# Patient Record
Sex: FEMALE | Race: WHITE
[De-identification: names, ages, dates, MRNs, and addresses within clinical notes are randomized per-mention and may not be internally consistent; named-entity substitution may affect disease eponyms.]

---

## 2020-06-13 ENCOUNTER — HOSPITAL ENCOUNTER (EMERGENCY)
Dept: HOSPITAL 62 - ER | Age: 50
LOS: 1 days | Discharge: HOME | End: 2020-06-14
Payer: COMMERCIAL

## 2020-06-13 DIAGNOSIS — F19.988: ICD-10-CM

## 2020-06-13 DIAGNOSIS — R07.9: ICD-10-CM

## 2020-06-13 DIAGNOSIS — Z79.891: ICD-10-CM

## 2020-06-13 DIAGNOSIS — R68.89: Primary | ICD-10-CM

## 2020-06-13 DIAGNOSIS — M54.9: ICD-10-CM

## 2020-06-13 PROCEDURE — 80307 DRUG TEST PRSMV CHEM ANLYZR: CPT

## 2020-06-13 PROCEDURE — 93010 ELECTROCARDIOGRAM REPORT: CPT

## 2020-06-13 PROCEDURE — 71045 X-RAY EXAM CHEST 1 VIEW: CPT

## 2020-06-13 PROCEDURE — 36415 COLL VENOUS BLD VENIPUNCTURE: CPT

## 2020-06-13 PROCEDURE — 85025 COMPLETE CBC W/AUTO DIFF WBC: CPT

## 2020-06-13 PROCEDURE — 99285 EMERGENCY DEPT VISIT HI MDM: CPT

## 2020-06-13 PROCEDURE — 96360 HYDRATION IV INFUSION INIT: CPT

## 2020-06-13 PROCEDURE — 93005 ELECTROCARDIOGRAM TRACING: CPT

## 2020-06-13 PROCEDURE — 80053 COMPREHEN METABOLIC PANEL: CPT

## 2020-06-13 PROCEDURE — 84484 ASSAY OF TROPONIN QUANT: CPT

## 2020-06-13 PROCEDURE — 82553 CREATINE MB FRACTION: CPT

## 2020-06-13 PROCEDURE — 82550 ASSAY OF CK (CPK): CPT

## 2020-06-13 PROCEDURE — 81001 URINALYSIS AUTO W/SCOPE: CPT

## 2020-06-14 VITALS — DIASTOLIC BLOOD PRESSURE: 71 MMHG | SYSTOLIC BLOOD PRESSURE: 113 MMHG

## 2020-06-14 LAB
ADD MANUAL DIFF: NO
ADD MANUAL DIFF: NO
ALBUMIN SERPL-MCNC: 4.1 G/DL (ref 3.5–5)
ALP SERPL-CCNC: 114 U/L (ref 38–126)
ANION GAP SERPL CALC-SCNC: 8 MMOL/L (ref 5–19)
APPEARANCE UR: CLEAR
APTT PPP: YELLOW S
AST SERPL-CCNC: 36 U/L (ref 14–36)
BARBITURATES UR QL SCN: NEGATIVE
BASOPHILS # BLD AUTO: 0 10^3/UL (ref 0–0.2)
BASOPHILS NFR BLD AUTO: 0.3 % (ref 0–2)
BILIRUB DIRECT SERPL-MCNC: 0 MG/DL (ref 0–0.4)
BILIRUB SERPL-MCNC: 0.2 MG/DL (ref 0.2–1.3)
BILIRUB UR QL STRIP: NEGATIVE
BUN SERPL-MCNC: 10 MG/DL (ref 7–20)
CALCIUM: 8.6 MG/DL (ref 8.4–10.2)
CHLORIDE SERPL-SCNC: 103 MMOL/L (ref 98–107)
CK MB SERPL-MCNC: 4.03 NG/ML (ref ?–4.55)
CK SERPL-CCNC: 398 U/L (ref 30–135)
CO2 SERPL-SCNC: 26 MMOL/L (ref 22–30)
EOSINOPHIL # BLD AUTO: 0.1 10^3/UL (ref 0–0.6)
EOSINOPHIL NFR BLD AUTO: 0.8 % (ref 0–6)
ERYTHROCYTE [DISTWIDTH] IN BLOOD BY AUTOMATED COUNT: 14.8 % (ref 11.5–14)
GLUCOSE SERPL-MCNC: 147 MG/DL (ref 75–110)
GLUCOSE UR STRIP-MCNC: 50 MG/DL
HCT VFR BLD CALC: 39.7 % (ref 36–47)
HGB BLD-MCNC: 13.5 G/DL (ref 12–15.5)
KETONES UR STRIP-MCNC: NEGATIVE MG/DL
LYMPHOCYTES # BLD AUTO: 1.5 10^3/UL (ref 0.5–4.7)
LYMPHOCYTES NFR BLD AUTO: 13.4 % (ref 13–45)
MCH RBC QN AUTO: 32.5 PG (ref 27–33.4)
MCHC RBC AUTO-ENTMCNC: 34 G/DL (ref 32–36)
MCV RBC AUTO: 96 FL (ref 80–97)
METHADONE UR QL SCN: NEGATIVE
MONOCYTES # BLD AUTO: 0.6 10^3/UL (ref 0.1–1.4)
MONOCYTES NFR BLD AUTO: 5.4 % (ref 3–13)
NEUTROPHILS # BLD AUTO: 8.8 10^3/UL (ref 1.7–8.2)
NEUTS SEG NFR BLD AUTO: 80.1 % (ref 42–78)
PCP UR QL SCN: NEGATIVE
PH UR STRIP: 5 [PH] (ref 5–9)
PLATELET # BLD: 185 10^3/UL (ref 150–450)
POTASSIUM SERPL-SCNC: 3.9 MMOL/L (ref 3.6–5)
PROT SERPL-MCNC: 7.1 G/DL (ref 6.3–8.2)
PROT UR STRIP-MCNC: NEGATIVE MG/DL
RBC # BLD AUTO: 4.15 10^6/UL (ref 3.72–5.28)
SP GR UR STRIP: 1.01
TOTAL CELLS COUNTED % (AUTO): 100 %
TROPONIN I SERPL-MCNC: 0.01 NG/ML
URINE AMPHETAMINES SCREEN: NEGATIVE
URINE BENZODIAZEPINES SCREEN: NEGATIVE
URINE COCAINE SCREEN: NEGATIVE
URINE MARIJUANA (THC) SCREEN: NEGATIVE
UROBILINOGEN UR-MCNC: NEGATIVE MG/DL (ref ?–2)
WBC # BLD AUTO: 11 10^3/UL (ref 4–10.5)

## 2020-06-14 NOTE — EKG REPORT
SEVERITY:- BORDERLINE ECG -

SINUS RHYTHM

BORDERLINE T ABNORMALITIES, INFERIOR LEADS

:

Confirmed by: Janneth Valentino MD 14-Jun-2020 22:44:06

## 2020-06-14 NOTE — ER DOCUMENT REPORT
Entered by ISELA BUSTOS SCRIBE  20 [id number] 





Acting as scribe for:TARA JONES IV, MD





ED Substance Abuse / Acc. OD





- General


Stated Complaint: POSSIBLE OVERDOSE


Time Seen by Provider: 20 23:41


Mode of Arrival: Medic


Information source: Emergency Med Personnel


Notes: 





This 50 year old female patient brought in by EMS from a hotel presents to the 

ED today with complaints of possible overdose that occurred just prior to 

arrival. Per EMS, the patient was found unresponsive and purple by sister who 

attempted CPR. Upon their arrival, patient was initially GCS 3 with agonal 

respirations, RR 2-3, and pinpoint pupils. EMS administered a total of x4 mg 

Narcan which increased respirations and placed the patient on 2L O2 via NC. ED 

nurse reports that the patient takes 10 mg Oxycodone for chronic back pain. ED 

nurse also states that the patient complained of sternal chest pain upon ED 

arrival. Denies shortness of breath.





- Related Data


Allergies/Adverse Reactions: 


                                        





No Known Allergies Allergy (Verified 20 00:29)


   











Past Medical History





- General


Information source: Emergency Med Personnel





- Social History


Smoking Status: Unknown if Ever Smoked


Cigarette use (# per day): No


Chew tobacco use (# tins/day): No


Smoking Education Provided: No


Lives with: Family


Family History: Reviewed & Not Pertinent


Patient has suicidal ideation: No


Patient has homicidal ideation: No





Review of Systems





- Review of Systems


Constitutional: No symptoms reported


EENT: No symptoms reported


Cardiovascular: See HPI, Chest pain


Respiratory: See HPI.  denies: Short of breath


Gastrointestinal: No symptoms reported


Genitourinary: No symptoms reported


Female Genitourinary: No symptoms reported


Musculoskeletal: No symptoms reported


Skin: No symptoms reported


Hematologic/Lymphatic: No symptoms reported


Neurological/Psychological: See HPI, Other - Possible overdose


-: Yes All other systems reviewed and negative





Physical Exam





- Vital signs


Vitals: 


                                        











Temp


 


 98.2 F 


 


 20 23:39














- General


General appearance: Alert


In distress: None





- HEENT


Head: Normocephalic, Atraumatic


Eyes: Normal


Pupils: PERRL





- Respiratory


Respiratory status: No respiratory distress


Chest status: Nontender


Breath sounds: Normal


Chest palpation: Normal





- Cardiovascular


Rhythm: Regular


Heart sounds: Normal auscultation


Murmur: No


Friction rub: No


Gallop: None auscultated





- Abdominal


Inspection: Normal


Distension: No distension


Bowel sounds: Normal


Tenderness: Nontender - Abdomen soft


Organomegaly: No organomegaly





- Back


Back: Normal, Nontender





- Extremities


General upper extremity: Normal inspection


General lower extremity: Normal inspection





- Neurological


Neuro grossly intact: Yes


Orientation: AAOx4


[place] Coma Scale Eye Opening: Spontaneous


[place] Coma Scale Verbal: Oriented


[place] Coma Scale Motor: Obeys Commands


[place] Coma Scale Total: 15





- Psychological


Associated symptoms: Normal affect, Normal mood





- Skin


Skin Temperature: Warm


Skin Moisture: Dry


Skin Color: Normal





Course





- Re-evaluation


Re-evalutation: 





20 06:12


Results of ED MSE discussed with patient.  All questions were answered prior to 

discharge.  Emergency signs and symptoms, reasons to return to the emergency 

department discussed with patient.





- Vital Signs


Vital signs: 


                                        











Temp Pulse Resp BP Pulse Ox


 


 98.2 F      23 H  136/75 H  98 


 


 20 23:40     20 05:01  20 05:00  20 05:01














- Laboratory


Result Diagrams: 


                                 20 00:00





                                 20 00:00


Laboratory results interpreted by me: 


                                        











  20





  00:00 00:00 00:00


 


WBC  11.0 H  


 


RDW  14.8 H  


 


Absolute Neuts (auto)  8.8 H  


 


Seg Neutrophils %  80.1 H  


 


Glucose    147 H


 


Creatine Kinase    398 H


 


Urine Glucose (UA)   50 H 














  20





  02:53 05:03


 


WBC  


 


RDW  


 


Absolute Neuts (auto)  


 


Seg Neutrophils %  


 


Glucose  


 


Creatine Kinase  295 H  261 H


 


Urine Glucose (UA)  














- Diagnostic Test


Radiology reviewed: Reports reviewed





- EKG Interpretation by Me


Additional EKG results interpreted by me: 





20 05:17


Initial EKG obtained on 2020 at 2344 hrs. was interpreted by this MD.  

Findings: Normal sinus rhythm, rate 98, normal axis, P waves proceed QRS 

complexes, QRS complexes appear narrow, there are no obvious patterns of ST 

segment elevation or depression present to suggest acute myocardial ischemia or 

infarction.  Impression: Normal sinus rhythm with nonspecific ST segments.





Repeat EKG obtained on 2020 at 0513 hrs. was interpreted by this MD.  

Findings: Sinus tachycardia, rate 101, normal axis, P waves preceding QRS 

complexes, QRS complexes appear narrow, there are no obvious patterns of ST 

segment elevation or depression present to suggest acute myocardial ischemia or 

infarction.  Impression: Sinus tachycardia with nonspecific ST segments.





Discharge





- Discharge


Clinical Impression: 


 Sedated due to medication





Condition: Good


Disposition: HOME, SELF-CARE


Additional Instructions: 


Return to the Emergency Department without delay if any worse.





Take your medications only as prescribed.  Be certain to follow-up with your 

primary care provider on 6/15/2020.





HOME CARE INSTRUCTIONS & INFORMATION:  Thank you for choosing us for your 

medical needs. We hope you're satisfied with the care you received.  After you 

leave, you must properly care for your problem and, at the same time, observe 

its progress.  Any condition can change.  Some illnesses can change rapidly over

hours or days.  If your condition worsens, return to the Emergency Department or

see your physician promptly.





ABOUT YOUR X-RAYS AND EKG'S:   If you had an EKG or X-rays taken, they have been

read by the Emergency Physician. The X-rays and EKG's will also be read by a 

Radiologist or Cardiologist within 24 hours.  If discrepancies are noted, you 

will be notified by telephone.  Please be certain the ED has a correct telephone

number & address where you can be reached.  Also, realize that some fractures or

abnormalities do not show up on initial X-rays.  If your symptoms continue, see 

your physician.





ABOUT YOUR LABORATORY TEST:   If you had laboratory tests, the results have been

reviewed by the Emergency Physician.  Some test results (for example cultures) 

may not be available for several days.  You will be contacted if any test result

shows you need additional treatment.  Please be certain the ED has a correct 

telephone number and address where you can be reached.





ABOUT YOUR MEDICATIONS:  You will receive instructions on how to take your 

medicine on the prescription label you receive.  Additional information may be 

provided by the Pharmacy.  If you have questions afterwards, call the ED for 

clarification or further instructions.  Some prescribed medications may cause 

drowsiness.  Do not perform tasks such as driving a car or operating machinery 

without consulting your Pharmacist.  If you feel you need a refill of pain 

medication, your condition will need re-evaluation.  Please do not call for a 

refill of any medication.





ABOUT YOUR SIGNATURE:   Signature of this document acknowledges to followin. Understanding that you received emergency treatment and that you may be 

   released before al medical problems are known or treated. Please be certain  

   the ED has a correct phone number & address where you can be reached.


   2. Acknowledgement that you will arrange for follow-up care as recommended.


   3. Authorization for the Emergency Physician to provide information to your 

follow-up Physician in order to maximize your care.





AT ANY TIME, IF YOUR SYMPTOMS CHANGE SIGNIFICANTLY OR WORSEN OR YOU DEVELOP NEW 

SYMPTOMS, RETURN TO THE EMERGENCY DEPARTMENT IMMEDIATELY FOR RE-EVALUATION.





OUR GOAL IS TO PROVIDE EXCELLENT MEDICAL CARE!





WE HOPE THAT WE HAVE MET YOUR EXPECTATIONS DURING YOUR EMERGENCY DEPARTMENT 

VISIT AND THAT YOU FEEL YOU HAVE RECEIVED EXCELLENT CARE!














I personally performed the services described in the documentation, reviewed and

edited the documentation which was dictated to the scribe in my presence, and it

accurately records my words and actions.

## 2020-06-14 NOTE — EKG REPORT
SEVERITY:- BORDERLINE ECG -

SINUS TACHYCARDIA

BORDERLINE T ABNORMALITIES, ANTERIOR LEADS

:

Confirmed by: Janneth Valentino MD 14-Jun-2020 22:44:01

## 2020-06-14 NOTE — RADIOLOGY REPORT (SQ)
EXAM DESCRIPTION:



[facility]: XR CHEST 1 VIEW 



CLINICAL HISTORY:

50 years  Female;  chest pain after cpr;



COMPARISON: None.



FINDINGS:

Lungs: Lungs are clear, with no focal infiltrate, pneumothorax,

or pleural effusion. 

Mediastinum: Mediastinum is within normal limits for this

positioning.

Bones: No displaced rib fractures are identified. No subpleural

thickening.



IMPRESSION:

1.  No acute pulmonary findings.